# Patient Record
Sex: MALE | Race: WHITE | NOT HISPANIC OR LATINO | URBAN - METROPOLITAN AREA
[De-identification: names, ages, dates, MRNs, and addresses within clinical notes are randomized per-mention and may not be internally consistent; named-entity substitution may affect disease eponyms.]

---

## 2024-03-19 ENCOUNTER — DOCUMENTATION (OUTPATIENT)
Dept: OPHTHALMOLOGY | Facility: CLINIC | Age: 39
End: 2024-03-19
Payer: COMMERCIAL

## 2024-03-19 DIAGNOSIS — H18.822: Primary | ICD-10-CM

## 2024-03-19 RX ORDER — PREDNISOLONE ACETATE 10 MG/ML
1 SUSPENSION/ DROPS OPHTHALMIC 2 TIMES DAILY
Qty: 5 ML | Refills: 0 | Status: SHIPPED | OUTPATIENT
Start: 2024-03-19 | End: 2024-04-02

## 2024-03-19 RX ORDER — MOXIFLOXACIN 5 MG/ML
1 SOLUTION/ DROPS OPHTHALMIC 4 TIMES DAILY
Qty: 5 ML | Refills: 0 | Status: SHIPPED | OUTPATIENT
Start: 2024-03-19 | End: 2024-03-26

## 2024-03-19 ASSESSMENT — CONF VISUAL FIELD
OS_INFERIOR_NASAL_RESTRICTION: 0
OS_INFERIOR_TEMPORAL_RESTRICTION: 0
OD_INFERIOR_NASAL_RESTRICTION: 0
OS_SUPERIOR_TEMPORAL_RESTRICTION: 0
OD_SUPERIOR_TEMPORAL_RESTRICTION: 0
OS_NORMAL: 1
METHOD: COUNTING FINGERS
OD_SUPERIOR_NASAL_RESTRICTION: 0
OD_INFERIOR_TEMPORAL_RESTRICTION: 0
OD_NORMAL: 1
OS_SUPERIOR_NASAL_RESTRICTION: 0

## 2024-03-19 ASSESSMENT — VISUAL ACUITY
METHOD: SNELLEN - LINEAR
OD_CC: 20/20
OS_CC+: -1
OD_CC+: -2
OS_CC: 20/30

## 2024-03-19 ASSESSMENT — CUP TO DISC RATIO
OD_RATIO: 0.3
OS_RATIO: 0.3

## 2024-03-19 ASSESSMENT — SLIT LAMP EXAM - LIDS
COMMENTS: NORMAL
COMMENTS: NORMAL

## 2024-03-19 ASSESSMENT — EXTERNAL EXAM - LEFT EYE: OS_EXAM: NORMAL

## 2024-03-19 ASSESSMENT — TONOMETRY
IOP_METHOD: TONOPEN
OS_IOP_MMHG: 12
OD_IOP_MMHG: 15

## 2024-03-19 ASSESSMENT — EXTERNAL EXAM - RIGHT EYE: OD_EXAM: NORMAL

## 2024-03-19 NOTE — PROGRESS NOTES
History of Present Illness:  38 year old male who wears contact lens with no significant medical history and past ocular history of corneal ulcer. He endorse left eye pain and has noticed a spot on the corneal surface for a day. Endorse photophobia. States that it was worse yesterday. Initially thought that he had conjunctivitis. Reports one of his children is sick, and he has had allergy issues. Reports mild discharge. States that he was able to open his eye today and appreciated a white spot on the left eye. Patient denies double vision, blind spots, flashes, or floaters.    Past Ocular History: refractive error    Past Medical History: please refer to admission HPI    Family History: negative for glaucoma, AMD, and blindness    Medications: please refer to medication reconciliation    Allergies: please refer to patient allergy list        Examination:  Base Eye Exam       Visual Acuity (Snellen - Linear)         Right Left    Dist cc 20/20 -2 20/30 -1    Dist ph cc  NI              Tonometry (Tonopen, 11:25 AM)         Right Left    Pressure 15 12              Pupils         Dark Light Shape React APD    Right 6 4 Round Brisk None    Left 6 4 Round Brisk None              Visual Fields (Counting fingers)         Left Right     Full Full              Extraocular Movement         Right Left     Full, Ortho Full, Ortho              Neuro/Psych       Oriented x3: Yes              Dilation       Both eyes: 1% Mydriacyl & 2.5% Gilberto  @ 11:27 AM                  Additional Tests       Color         Right Left    Ishihara 11/11 11/11                  Slit Lamp and Fundus Exam       External Exam         Right Left    External Normal Normal              Slit Lamp Exam         Right Left    Lids/Lashes Normal Normal    Conjunctiva/Sclera White and quiet 1+ Injection    Cornea Clear 0.75 x 0.75mm paracentral subepithelial infiltrate @ 10 o/c;, 0.5 x0.25 epithelial defect    Anterior Chamber Deep and quiet 2+ Cell    Iris  Round and reactive Round and reactive    Lens Clear Clear              Fundus Exam         Right Left    Vitreous Normal Normal    Disc Normal Normal    C/D Ratio 0.3 0.3    Macula Normal Normal    Vessels Normal Normal    Periphery Normal Normal                    Impression and Recommendations:  # corneal ulcer, left eye   37 yo M with history of contact lens usage who reports eye pain, photophobia, redness found to have subepithelial infiltrate, epithelial defect and anterior chamber (AC) reaction of the left eye. Differential includes bacterial keratitis vs CL- induced peripheral ulcer.  Subepithelial infiltrate and eip defect are small in the periphery. Patient has reported symptomatic relief since contact lens discontinuation. However, given that patient is a contact lens wearer, will treat for bacterial keratitis.    - Recommend Moxifloxacin QID left eye  - Recommend prednisolone BID left eye  - Recommend artificial tears QID both eyes  - Recommend contact lens holiday. Patient does not have prescription glasses but has prescription shades.   - Recommend good hand hygiene   - Will arrange follow up with comprehensive ophthalmologist.    Patient discussed and evaluated with senior resident Dr. Llanos.    Please contact the ophthalmology service for further questions/comments.  Note not final until signed by attending.  Naima Morris MD PhD  Ophthalmology PGY3  [id number] (adult)/[id number] (peds)  To schedule appointment for adult patients: [phone]  To schedule appointments for pediatric patients: [phone]

## 2024-03-20 ENCOUNTER — OFFICE VISIT (OUTPATIENT)
Dept: OPHTHALMOLOGY | Facility: CLINIC | Age: 39
End: 2024-03-20
Payer: COMMERCIAL

## 2024-03-20 DIAGNOSIS — H52.203 ASTIGMATISM OF BOTH EYES, UNSPECIFIED TYPE: ICD-10-CM

## 2024-03-20 DIAGNOSIS — H18.20 INFILTRATE OF CORNEA: Primary | ICD-10-CM

## 2024-03-20 DIAGNOSIS — H52.13 MYOPIA OF BOTH EYES: ICD-10-CM

## 2024-03-20 DIAGNOSIS — G43.109 OCULAR MIGRAINE: ICD-10-CM

## 2024-03-20 PROCEDURE — 99203 OFFICE O/P NEW LOW 30 MIN: CPT | Performed by: OPHTHALMOLOGY

## 2024-03-20 PROCEDURE — 1036F TOBACCO NON-USER: CPT | Performed by: OPHTHALMOLOGY

## 2024-03-20 ASSESSMENT — VISUAL ACUITY
CORRECTION_TYPE: GLASSES
METHOD: SNELLEN - LINEAR
OS_CC: 20/25+1
OD_CC: 20/20-1

## 2024-03-20 ASSESSMENT — ENCOUNTER SYMPTOMS
GASTROINTESTINAL NEGATIVE: 0
HEMATOLOGIC/LYMPHATIC NEGATIVE: 0
CONSTITUTIONAL NEGATIVE: 0
NEUROLOGICAL NEGATIVE: 0
PSYCHIATRIC NEGATIVE: 0
RESPIRATORY NEGATIVE: 0
ALLERGIC/IMMUNOLOGIC NEGATIVE: 0
MUSCULOSKELETAL NEGATIVE: 0
CARDIOVASCULAR NEGATIVE: 0
EYES NEGATIVE: 1
ENDOCRINE NEGATIVE: 0

## 2024-03-20 ASSESSMENT — EXTERNAL EXAM - LEFT EYE: OS_EXAM: NORMAL

## 2024-03-20 ASSESSMENT — TONOMETRY
OD_IOP_MMHG: 16
OS_IOP_MMHG: 16
IOP_METHOD: GOLDMANN APPLANATION

## 2024-03-20 ASSESSMENT — REFRACTION_WEARINGRX
OD_CYLINDER: -1.25
OS_AXIS: 005
OD_AXIS: 170
OS_CYLINDER: -0.75
OS_SPHERE: -3.25
OD_SPHERE: -3.75

## 2024-03-20 ASSESSMENT — SLIT LAMP EXAM - LIDS
COMMENTS: NORMAL
COMMENTS: NORMAL

## 2024-03-20 ASSESSMENT — EXTERNAL EXAM - RIGHT EYE: OD_EXAM: NORMAL

## 2024-03-20 NOTE — PROGRESS NOTES
Corneal infiltrate, left eye  -Irritation started on 3/16/24, photophobia. Noticed white spot on cornea 3/19/24, seen in triage clinic  -Has been out of CL's since 3/16  -No history of wearing CL overnight, wearing daily disposable lenses.   -History of CL related ulcer about 11 years ago OS  -Symptoms and exam improved since yesterday  -Continue Moxifloxacin QID left eye  -Continue prednisolone BID left eye  -Continue artificial tears QID both eyes  -No contact lens wear until condition is resolved.   -F/u Monday for cornea check, sooner if symptoms worsen    Corneal scar, right eye  -Possible CL related infiltrate vs h/o MFB. Patient does not recall metal FB in eye. Monitor, not visually significant.     Ocular migraine  -For the past 10-15 years, gets about 2-3 episodes (not every year) of jagged lines/lightening bolt in vision with some vision loss off to one side. Symptoms last for about an hour. No headache.  -Symptoms sound consistent with ocular migraine. Monitor. Can consider neuroimaging in the future if symptoms worsen or become more frequent.     Myopia  Astigmatism  -Patient to see Optometry for refraction

## 2024-03-25 ENCOUNTER — OFFICE VISIT (OUTPATIENT)
Dept: OPHTHALMOLOGY | Facility: CLINIC | Age: 39
End: 2024-03-25
Payer: COMMERCIAL

## 2024-03-25 DIAGNOSIS — H18.20 INFILTRATE OF LEFT CORNEA: Primary | ICD-10-CM

## 2024-03-25 PROCEDURE — 99213 OFFICE O/P EST LOW 20 MIN: CPT | Performed by: STUDENT IN AN ORGANIZED HEALTH CARE EDUCATION/TRAINING PROGRAM

## 2024-03-25 ASSESSMENT — ENCOUNTER SYMPTOMS
PSYCHIATRIC NEGATIVE: 0
EYES NEGATIVE: 0
CARDIOVASCULAR NEGATIVE: 0
GASTROINTESTINAL NEGATIVE: 0
CONSTITUTIONAL NEGATIVE: 0
ENDOCRINE NEGATIVE: 0
NEUROLOGICAL NEGATIVE: 0
HEMATOLOGIC/LYMPHATIC NEGATIVE: 0
ALLERGIC/IMMUNOLOGIC NEGATIVE: 0
RESPIRATORY NEGATIVE: 0
MUSCULOSKELETAL NEGATIVE: 0

## 2024-03-25 ASSESSMENT — VISUAL ACUITY
CORRECTION_TYPE: GLASSES
OD_CC: 20/20
OS_CC: 20/20
METHOD: SNELLEN - LINEAR
OS_CC+: -2

## 2024-03-25 ASSESSMENT — CONF VISUAL FIELD
OS_INFERIOR_NASAL_RESTRICTION: 0
OD_SUPERIOR_TEMPORAL_RESTRICTION: 0
METHOD: COUNTING FINGERS
OS_SUPERIOR_TEMPORAL_RESTRICTION: 0
OD_SUPERIOR_NASAL_RESTRICTION: 0
OS_SUPERIOR_NASAL_RESTRICTION: 0
OD_INFERIOR_TEMPORAL_RESTRICTION: 0
OD_NORMAL: 1
OD_INFERIOR_NASAL_RESTRICTION: 0
OS_NORMAL: 1
OS_INFERIOR_TEMPORAL_RESTRICTION: 0

## 2024-03-25 ASSESSMENT — SLIT LAMP EXAM - LIDS
COMMENTS: NORMAL
COMMENTS: NORMAL

## 2024-03-25 ASSESSMENT — TONOMETRY
OS_IOP_MMHG: 14
IOP_METHOD: GOLDMANN APPLANATION
OD_IOP_MMHG: 14

## 2024-03-25 ASSESSMENT — EXTERNAL EXAM - LEFT EYE: OS_EXAM: NORMAL

## 2024-03-25 ASSESSMENT — EXTERNAL EXAM - RIGHT EYE: OD_EXAM: NORMAL

## 2024-03-25 NOTE — PROGRESS NOTES
Corneal infiltrate, left eye  -Irritation started on 3/16/24, photophobia. Noticed white spot on cornea 3/19/24, seen in triage clinic  -F/u from vision 3/20  -Has been out of CL's since 3/16  -No history of wearing CL overnight, wearing daily disposable lenses.   -History of CL related ulcer about 11 years ago OS  -Symptoms and exam improved with small staining overlying scar  -Decrease Moxifloxacin to BID left eye  -Decrease prednisolone to Qday left eye  -Continue artificial tears QID both eyes  -No contact lens wear until condition is resolved.   -with small staining still present will continue with treatment but with a taper; RTC 1-2 weeks for F/u    Corneal scar, right eye  -Possible CL related infiltrate vs h/o MFB. Patient does not recall metal FB in eye. Monitor, not visually significant.     Ocular migraine  -For the past 10-15 years, gets about 2-3 episodes (not every year) of jagged lines/lightening bolt in vision with some vision loss off to one side. Symptoms last for about an hour. No headache.  -Symptoms sound consistent with ocular migraine. Monitor. Can consider neuroimaging in the future if symptoms worsen or become more frequent.     RTC 1-2 weeks for F/u cornea check with MRX

## 2024-04-03 ENCOUNTER — APPOINTMENT (OUTPATIENT)
Dept: OPHTHALMOLOGY | Facility: CLINIC | Age: 39
End: 2024-04-03
Payer: COMMERCIAL

## 2024-04-10 ENCOUNTER — OFFICE VISIT (OUTPATIENT)
Dept: OPHTHALMOLOGY | Facility: CLINIC | Age: 39
End: 2024-04-10
Payer: COMMERCIAL

## 2024-04-10 DIAGNOSIS — H18.20 INFILTRATE OF LEFT CORNEA: Primary | ICD-10-CM

## 2024-04-10 DIAGNOSIS — H52.13 MYOPIA OF BOTH EYES: ICD-10-CM

## 2024-04-10 PROCEDURE — 99213 OFFICE O/P EST LOW 20 MIN: CPT | Performed by: STUDENT IN AN ORGANIZED HEALTH CARE EDUCATION/TRAINING PROGRAM

## 2024-04-10 ASSESSMENT — ENCOUNTER SYMPTOMS
RESPIRATORY NEGATIVE: 0
GASTROINTESTINAL NEGATIVE: 0
NEUROLOGICAL NEGATIVE: 0
EYES NEGATIVE: 1
MUSCULOSKELETAL NEGATIVE: 0
PSYCHIATRIC NEGATIVE: 0
ALLERGIC/IMMUNOLOGIC NEGATIVE: 0
CONSTITUTIONAL NEGATIVE: 0
CARDIOVASCULAR NEGATIVE: 0
ENDOCRINE NEGATIVE: 0
HEMATOLOGIC/LYMPHATIC NEGATIVE: 0

## 2024-04-10 ASSESSMENT — REFRACTION_MANIFEST
OD_CYLINDER: -0.50
OS_SPHERE: -3.50
OS_CYLINDER: -0.50
OD_AXIS: 150
OD_SPHERE: -3.75
OS_AXIS: 030

## 2024-04-10 ASSESSMENT — CONF VISUAL FIELD
OD_INFERIOR_TEMPORAL_RESTRICTION: 0
OS_SUPERIOR_NASAL_RESTRICTION: 0
OS_INFERIOR_TEMPORAL_RESTRICTION: 0
OD_NORMAL: 1
OD_SUPERIOR_TEMPORAL_RESTRICTION: 0
METHOD: COUNTING FINGERS
OD_SUPERIOR_NASAL_RESTRICTION: 0
OS_NORMAL: 1
OS_SUPERIOR_TEMPORAL_RESTRICTION: 0
OS_INFERIOR_NASAL_RESTRICTION: 0
OD_INFERIOR_NASAL_RESTRICTION: 0

## 2024-04-10 ASSESSMENT — REFRACTION_WEARINGRX
OD_AXIS: 170
OS_SPHERE: -3.25
OS_CYLINDER: -0.75
OD_SPHERE: -3.75
OD_CYLINDER: -1.25
OS_AXIS: 005

## 2024-04-10 ASSESSMENT — VISUAL ACUITY
METHOD: SNELLEN - LINEAR
OD_CC: 20/20
OS_CC: 20/20

## 2024-04-10 ASSESSMENT — EXTERNAL EXAM - RIGHT EYE: OD_EXAM: NORMAL

## 2024-04-10 ASSESSMENT — SLIT LAMP EXAM - LIDS
COMMENTS: NORMAL
COMMENTS: NORMAL

## 2024-04-10 ASSESSMENT — TONOMETRY
OD_IOP_MMHG: 21
IOP_METHOD: TONOPEN
OS_IOP_MMHG: 23

## 2024-04-10 ASSESSMENT — EXTERNAL EXAM - LEFT EYE: OS_EXAM: NORMAL

## 2024-04-10 NOTE — PROGRESS NOTES
Assessment/Plan   Diagnoses and all orders for this visit:  Infiltrate of left cornea  -Irritation started on 3/16/24, photophobia. Noticed white spot on cornea 3/19/24, seen in triage clinic  -Has been out of CL's since 3/16  -No history of wearing CL overnight, wearing daily disposable lenses.   -History of CL related ulcer about 11 years ago OS  -Patient has finished treatment with drops; condition resolved with no corneal staining; small residual scar OS outside of visual axis    Myopia of both eyes  -updated MRX for patient today with good BCVA 20/20 OD+OS  -small changes to manifest refraction (MR) with axis accepted on trial frame  -patient has decided to start wearing glasses only at this time-not interested in going back into contacts    Corneal scar, right eye  -Possible CL related infiltrate vs h/o MFB. Patient does not recall metal FB in eye. Monitor, not visually significant.     Ocular migraine  -For the past 10-15 years, gets about 2-3 episodes (not every year) of jagged lines/lightening bolt in vision with some vision loss off to one side. Symptoms last for about an hour. No headache.  -Symptoms sound consistent with ocular migraine. Monitor. Can consider neuroimaging in the future if symptoms worsen or become more frequent.     RTC for annual exam 1 year for DFE, MRX